# Patient Record
Sex: MALE | Race: WHITE | NOT HISPANIC OR LATINO | Employment: STUDENT | RURAL
[De-identification: names, ages, dates, MRNs, and addresses within clinical notes are randomized per-mention and may not be internally consistent; named-entity substitution may affect disease eponyms.]

---

## 2017-02-28 ENCOUNTER — OFFICE VISIT (OUTPATIENT)
Dept: RETAIL CLINIC | Facility: CLINIC | Age: 22
End: 2017-02-28

## 2017-02-28 DIAGNOSIS — J02.0 STREPTOCOCCAL SORE THROAT: Primary | ICD-10-CM

## 2017-02-28 PROCEDURE — 99213 OFFICE O/P EST LOW 20 MIN: CPT | Performed by: NURSE PRACTITIONER

## 2017-02-28 RX ORDER — CEPHALEXIN 500 MG/1
500 CAPSULE ORAL 2 TIMES DAILY
Qty: 20 CAPSULE | Refills: 0 | Status: SHIPPED | OUTPATIENT
Start: 2017-02-28 | End: 2017-03-10

## 2017-03-04 VITALS — HEART RATE: 98 BPM | RESPIRATION RATE: 18 BRPM | OXYGEN SATURATION: 98 % | TEMPERATURE: 101.4 F

## 2017-07-28 ENCOUNTER — OFFICE VISIT (OUTPATIENT)
Dept: RETAIL CLINIC | Facility: CLINIC | Age: 22
End: 2017-07-28

## 2017-07-28 DIAGNOSIS — Z11.1 VISIT FOR TB SKIN TEST: Primary | ICD-10-CM

## 2017-07-28 PROCEDURE — 86580 TB INTRADERMAL TEST: CPT | Performed by: NURSE PRACTITIONER

## 2017-07-31 LAB
INDURATION: 0 MM (ref 0–10)
TB SKIN TEST: NEGATIVE

## 2017-08-10 ENCOUNTER — OFFICE VISIT (OUTPATIENT)
Dept: RETAIL CLINIC | Facility: CLINIC | Age: 22
End: 2017-08-10

## 2017-08-10 DIAGNOSIS — Z11.1 VISIT FOR TB SKIN TEST: Primary | ICD-10-CM

## 2017-08-10 PROCEDURE — 86580 TB INTRADERMAL TEST: CPT | Performed by: NURSE PRACTITIONER

## 2017-08-13 LAB
INDURATION: 0 MM (ref 0–10)
TB SKIN TEST: NEGATIVE

## 2019-06-01 ENCOUNTER — HOSPITAL ENCOUNTER (EMERGENCY)
Facility: HOSPITAL | Age: 24
Discharge: HOME | End: 2019-06-01
Attending: EMERGENCY MEDICINE | Admitting: EMERGENCY MEDICINE

## 2019-06-01 VITALS
SYSTOLIC BLOOD PRESSURE: 141 MMHG | RESPIRATION RATE: 18 BRPM | WEIGHT: 200 LBS | DIASTOLIC BLOOD PRESSURE: 97 MMHG | BODY MASS INDEX: 25.67 KG/M2 | HEIGHT: 74 IN | TEMPERATURE: 98.5 F | OXYGEN SATURATION: 99 % | HEART RATE: 100 BPM

## 2019-06-01 DIAGNOSIS — F41.0 PANIC ATTACKS: Primary | ICD-10-CM

## 2019-06-01 PROCEDURE — 99282 EMERGENCY DEPT VISIT SF MDM: CPT | Performed by: EMERGENCY MEDICINE

## 2019-06-01 PROCEDURE — 99283 EMERGENCY DEPT VISIT LOW MDM: CPT

## 2019-06-01 RX ORDER — LORAZEPAM 1 MG/1
2 TABLET ORAL ONCE
Status: COMPLETED | OUTPATIENT
Start: 2019-06-01 | End: 2019-06-01

## 2019-06-01 RX ORDER — LORAZEPAM 1 MG/1
1 TABLET ORAL EVERY 8 HOURS PRN
Qty: 15 TABLET | Refills: 0 | Status: SHIPPED | OUTPATIENT
Start: 2019-06-01

## 2019-06-01 RX ADMIN — LORAZEPAM 2 MG: 1 TABLET ORAL at 20:24

## 2019-06-02 NOTE — ED PROVIDER NOTES
"Subjective     History provided by:  Parent and patient    History of Present Illness    · Chief complaint: Anxiety attack    · Location: Generalized    · Quality/Severity: The patient said he \"felt impending doom.\"  He felt like his heart was racing fast and he got lightheaded.  It felt like previous anxiety attacks.    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    · Modifying Factors: He was not able to calm himself down.  He had a coworker walked him out and he had to leave work early.  He is not on any psychiatric medications.    · Associated symptoms: He said his he felt lightheaded and that they took his blood pressure at work and it was elevated, also had a elevated heart rate.  He denies any chest pain or shortness of breath.  He denies any other physical symptoms.  Denies feeling like he was going to pass out or any loss of consciousness.  Denies any suicidal or homicidal ideations.    · Narrative: The patient is a 24-year-old white male who presents after an anxiety attack that occurred an hour prior to arrival while at work.  He had a coworker walked him out and he took himself to the emergency room.  He said he felt \"impending doom.\"  He says he has had anxiety attacks in the past and this felt similar to that, but he could not control this one.  He felt lightheaded and that his heart was racing fast, but denies any chest pain or shortness of breath.  He denies any loss of consciousness.  Denies any suicidal or homicidal ideations.  He does not have a primary care provider who he sees regularly and has never been worked up for his anxiety and depression.  When asked about psychiatric family history, his mother in the room admitted to some anxiety and depression.  She also states that the patient's father is that his father is \"bat shit ripy\" but could not elaborate to his diagnoses.  The patient does admit to increased alcohol consumption recently because it was his " "birthday.  States that he knows he needs to cut back.      Review of Systems   Constitutional: Negative for activity change, appetite change, chills, diaphoresis, fatigue and fever.   HENT: Negative for congestion, dental problem, ear pain, hearing loss, mouth sores, postnasal drip, rhinorrhea, sinus pressure, sore throat and voice change.    Eyes: Negative for photophobia, pain, discharge, redness and visual disturbance.   Respiratory: Negative for cough, chest tightness, shortness of breath, wheezing and stridor.    Cardiovascular: Negative for chest pain, palpitations and leg swelling.   Gastrointestinal: Negative for abdominal pain, diarrhea, nausea and vomiting.   Genitourinary: Negative for difficulty urinating, dysuria, flank pain, frequency, hematuria and urgency.   Musculoskeletal: Negative for arthralgias, back pain, gait problem, joint swelling, myalgias, neck pain and neck stiffness.   Skin: Negative for color change and rash.   Neurological: Negative for dizziness, tremors, seizures, syncope, facial asymmetry, speech difficulty, weakness, light-headedness, numbness and headaches.   Hematological: Negative for adenopathy.   Psychiatric/Behavioral: Negative for agitation, confusion, decreased concentration, hallucinations, self-injury and suicidal ideas. The patient is nervous/anxious.         Patient says he has been very tearful lately and does not feel like he can control his stress.     History reviewed. No pertinent past medical history.  /97 (BP Location: Right arm, Patient Position: Sitting)   Pulse 100   Temp 98.5 °F (36.9 °C) (Oral)   Resp 18   Ht 188 cm (74\")   Wt 90.7 kg (200 lb)   SpO2 99%   BMI 25.68 kg/m²     History reviewed. No pertinent past medical history.  Labs Reviewed - No data to display    No Known Allergies    History reviewed. No pertinent surgical history.    History reviewed. No pertinent family history.    Social History     Socioeconomic History   • Marital " status: Single     Spouse name: Not on file   • Number of children: Not on file   • Years of education: Not on file   • Highest education level: Not on file   Tobacco Use   • Smoking status: Current Every Day Smoker     Packs/day: 0.50   Substance and Sexual Activity   • Alcohol use: Yes   • Drug use: Defer   • Sexual activity: Defer           Objective   Physical Exam   Constitutional: He is oriented to person, place, and time. He appears well-developed and well-nourished. No distress.   The patient is tearful, but does not appear in acute distress.  He appears his stated age.  Review of his vital signs: His blood pressure was slightly elevated at 1 four 1/97, heart rate slightly elevated at 100, respiration rate 18, he is afebrile 98.5, with normal oxygen saturation at 99% on room air.   HENT:   Head: Normocephalic and atraumatic.   Nose: Nose normal.   Mouth/Throat: Oropharynx is clear and moist. No oropharyngeal exudate.   Eyes: EOM are normal. Pupils are equal, round, and reactive to light. Right eye exhibits no discharge. Left eye exhibits no discharge. No scleral icterus.   Neck: Normal range of motion. Neck supple. No JVD present. No thyromegaly present.   Cardiovascular: Normal rate, regular rhythm and normal heart sounds.   No murmur heard.  Pulmonary/Chest: Effort normal and breath sounds normal. He has no wheezes. He has no rales. He exhibits no tenderness.   Abdominal: Soft. Bowel sounds are normal. He exhibits no distension. There is no tenderness.   Musculoskeletal: Normal range of motion. He exhibits no edema, tenderness or deformity.   Lymphadenopathy:     He has no cervical adenopathy.   Neurological: He is alert and oriented to person, place, and time. No cranial nerve deficit. Coordination normal.   No focal motor sensory deficit   Skin: Skin is warm and dry. No rash noted. He is not diaphoretic.   Psychiatric: His behavior is normal. Judgment and thought content normal.   The patient appears  noticeably anxious and tearful.  His thought content and judgment are normal.  He denies any suicidal homicidal ideations.   Nursing note and vitals reviewed.      Procedures           ED Course  ED Course as of Jun 01 2112   Sat Jun 01, 2019 2111 Patient appears chronic.  He was administered Ativan 2 mg p.o.  He was discharged with prescription for Ativan 1 mg to be taken every 8 hours as needed anxiety #15.  I recommended the patient follow-up with his PCP Dr. Dove concerning his anxiety.  Also recommended that he be evaluated at the [facility] for his anxiety.  [TP]      ED Course User Index  [TP] Tunde Gonzalez MD                  MDM  Number of Diagnoses or Management Options  Panic attacks: new and does not require workup     Amount and/or Complexity of Data Reviewed  Obtain history from someone other than the patient: yes    Risk of Complications, Morbidity, and/or Mortality  Presenting problems: low  Diagnostic procedures: minimal  Management options: low    Patient Progress  Patient progress: stable        Final diagnoses:   Panic attacks           Labs Reviewed - No data to display  No orders to display          Medication List      New Prescriptions    LORazepam 1 MG tablet  Commonly known as:  ATIVAN  Take 1 tablet by mouth Every 8 (Eight) Hours As Needed for Anxiety for up   to 15 doses.               Tunde Gonzalez MD  06/01/19 2113